# Patient Record
Sex: FEMALE | Race: WHITE
[De-identification: names, ages, dates, MRNs, and addresses within clinical notes are randomized per-mention and may not be internally consistent; named-entity substitution may affect disease eponyms.]

---

## 2023-10-31 ENCOUNTER — TRANSCRIBE ORDERS (OUTPATIENT)
Dept: OTHER | Age: 68
End: 2023-10-31

## 2023-10-31 ENCOUNTER — TELEPHONE (OUTPATIENT)
Dept: NEUROSURGERY | Facility: CLINIC | Age: 68
End: 2023-10-31
Payer: COMMERCIAL

## 2023-10-31 DIAGNOSIS — G93.89 MASS OF BRAIN: Primary | ICD-10-CM

## 2023-10-31 NOTE — TELEPHONE ENCOUNTER
Referral Transcribed by external fax    Provider: Dr. Abdi Gonzalez affiliated with [facility] [place].    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